# Patient Record
Sex: FEMALE | Race: BLACK OR AFRICAN AMERICAN
[De-identification: names, ages, dates, MRNs, and addresses within clinical notes are randomized per-mention and may not be internally consistent; named-entity substitution may affect disease eponyms.]

---

## 2018-12-20 ENCOUNTER — HOSPITAL ENCOUNTER (INPATIENT)
Dept: HOSPITAL 74 - JSAMEDAYSX | Age: 33
LOS: 1 days | Discharge: HOME | DRG: 621 | End: 2018-12-21
Attending: SURGERY | Admitting: SURGERY
Payer: COMMERCIAL

## 2018-12-20 VITALS — BODY MASS INDEX: 45.8 KG/M2

## 2018-12-20 DIAGNOSIS — E66.01: Primary | ICD-10-CM

## 2018-12-20 DIAGNOSIS — R16.0: ICD-10-CM

## 2018-12-20 LAB
ALBUMIN SERPL-MCNC: 3.2 G/DL (ref 3.4–5)
ALP SERPL-CCNC: 84 U/L (ref 45–117)
ALT SERPL-CCNC: 49 U/L (ref 13–61)
ANION GAP SERPL CALC-SCNC: 8 MMOL/L (ref 8–16)
AST SERPL-CCNC: 44 U/L (ref 15–37)
BILIRUB SERPL-MCNC: 0.4 MG/DL (ref 0.2–1)
BUN SERPL-MCNC: 8 MG/DL (ref 7–18)
CALCIUM SERPL-MCNC: 8.3 MG/DL (ref 8.5–10.1)
CHLORIDE SERPL-SCNC: 105 MMOL/L (ref 98–107)
CO2 SERPL-SCNC: 24 MMOL/L (ref 21–32)
CREAT SERPL-MCNC: 0.7 MG/DL (ref 0.55–1.3)
DEPRECATED RDW RBC AUTO: 17.3 % (ref 11.6–15.6)
GLUCOSE SERPL-MCNC: 137 MG/DL (ref 74–106)
HCT VFR BLD CALC: 32.1 % (ref 32.4–45.2)
HGB BLD-MCNC: 10.6 GM/DL (ref 10.7–15.3)
MCH RBC QN AUTO: 25.4 PG (ref 25.7–33.7)
MCHC RBC AUTO-ENTMCNC: 33.1 G/DL (ref 32–36)
MCV RBC: 76.7 FL (ref 80–96)
PLATELET # BLD AUTO: 376 K/MM3 (ref 134–434)
PMV BLD: 9.6 FL (ref 7.5–11.1)
POTASSIUM SERPLBLD-SCNC: 3.9 MMOL/L (ref 3.5–5.1)
PROT SERPL-MCNC: 6.9 G/DL (ref 6.4–8.2)
RBC # BLD AUTO: 4.19 M/MM3 (ref 3.6–5.2)
SODIUM SERPL-SCNC: 137 MMOL/L (ref 136–145)
WBC # BLD AUTO: 19.2 K/MM3 (ref 4–10)

## 2018-12-20 PROCEDURE — 0DB64Z3 EXCISION OF STOMACH, PERCUTANEOUS ENDOSCOPIC APPROACH, VERTICAL: ICD-10-PCS | Performed by: SURGERY

## 2018-12-20 PROCEDURE — 0DJ08ZZ INSPECTION OF UPPER INTESTINAL TRACT, VIA NATURAL OR ARTIFICIAL OPENING ENDOSCOPIC: ICD-10-PCS | Performed by: SURGERY

## 2018-12-20 PROCEDURE — 0FB24ZX EXCISION OF LEFT LOBE LIVER, PERCUTANEOUS ENDOSCOPIC APPROACH, DIAGNOSTIC: ICD-10-PCS | Performed by: SURGERY

## 2018-12-20 RX ADMIN — ONDANSETRON SCH MG: 2 INJECTION INTRAMUSCULAR; INTRAVENOUS at 21:55

## 2018-12-20 RX ADMIN — ACETAMINOPHEN SCH MG: 10 INJECTION, SOLUTION INTRAVENOUS at 20:12

## 2018-12-20 RX ADMIN — ONDANSETRON SCH MG: 2 INJECTION INTRAMUSCULAR; INTRAVENOUS at 19:24

## 2018-12-20 RX ADMIN — METOCLOPRAMIDE SCH MG: 5 INJECTION, SOLUTION INTRAMUSCULAR; INTRAVENOUS at 20:10

## 2018-12-20 RX ADMIN — ENOXAPARIN SODIUM SCH MG: 40 INJECTION SUBCUTANEOUS at 21:55

## 2018-12-20 RX ADMIN — ONDANSETRON SCH MG: 2 INJECTION INTRAMUSCULAR; INTRAVENOUS at 14:00

## 2018-12-20 RX ADMIN — ACETAMINOPHEN SCH MG: 10 INJECTION, SOLUTION INTRAVENOUS at 14:00

## 2018-12-20 RX ADMIN — FAMOTIDINE SCH MLS/HR: 20 INJECTION, SOLUTION INTRAVENOUS at 21:58

## 2018-12-20 RX ADMIN — METOCLOPRAMIDE SCH MG: 5 INJECTION, SOLUTION INTRAMUSCULAR; INTRAVENOUS at 14:00

## 2018-12-20 NOTE — HP
Admitting History and Physical





- Admission


Chief Complaint: Morbid obesity


History Source: Patient


Limitations to Obtaining History: No Limitations





- Past Medical History


Gastrointestinal: Yes: Other (Morbid obesity)


...LMP: 18





- Past Surgical History


Past Surgical History: Yes: 





- Smoking History


Smoking history: Never smoked





- Alcohol/Substance Use


Hx Alcohol Use: Yes (occas)





Home Medications





- Allergies


Allergies/Adverse Reactions: 


 Allergies











Allergy/AdvReac Type Severity Reaction Status Date / Time


 


No Known Allergies Allergy   Verified 18 13:58














- Home Medications


Home Medications: 


Ambulatory Orders





Famotidine [Pepcid] 20 mg PO BID #60 tablet 18 


Oxycodone HCl/Acetaminophen [Percocet 5-325 mg Tablet] 1 - 2 tab PO Q6H #28 tab 

MDD 4 18 











Family Disease History





- Family Disease History


Family History: Denies





Review of Systems





- Review of Systems


Constitutional: denies: Chills, Fever


HENT: reports: No Symptoms


Neck: reports: No Symptoms


Cardiovascular: reports: No Symptoms


Respiratory: reports: No Symptoms


Gastrointestinal: reports: No Symptoms


Neurological: reports: No Symptoms


Pain Intensity: 0





Physical Examination


Vital Signs: 


 Vital Signs











Temperature  97.5 F L  18 10:12


 


Pulse Rate  96 H  18 10:27


 


Respiratory Rate  18   18 10:27


 


Blood Pressure  125/68   18 10:27


 


O2 Sat by Pulse Oximetry (%)  97   18 10:27











Constitutional: Yes: No Distress


Neck: Yes: WNL


Cardiovascular: Yes: WNL


Respiratory: Yes: WNL


Gastrointestinal: Yes: Soft, Abdomen, Obese


Neurological: Yes: Alert, Oriented





Problem List





- Problems


(1) Morbid obesity due to excess calories


Code(s): E66.01 - MORBID (SEVERE) OBESITY DUE TO EXCESS CALORIES   





(2) BMI 45.0-49.9, adult


Code(s): Z68.42 - BODY MASS INDEX (BMI) 45.0-49.9, ADULT   





Assessment/Plan





Laparoscopic possible open vertical sleeve gastrectomy, possible liver biopsy, 

EGD

## 2018-12-20 NOTE — SURG
Surgery First Assist Note


First Assist: Ubaldo Uribe PA-C


Date of Service: 12/20/18


Diagnosis: 


Morbid obesity due to excess calories





Procedure: 


Laproscopic sleeve gastrectomy, EGD, liver biopsy





I was present for the entirety of the operative procedure. For further detail, 

please refer to operative report.








Visit type





- Case Type


Case Type: Scheduled





- Emergency


Emergency Visit: No





- New patient


This patient is new to me today: Yes


Date on this admission: 12/20/18

## 2018-12-20 NOTE — SPEC
DATE OF OPERATION:  12/20/2018

 

SURGEON:  Edwardo Garsia MD

 

ASSISTANT:  Ubaldo Uribe PA-C

 

PREOPERATIVE DIAGNOSES:

1.  Morbid obesity.

2.  Body mass index of 45.9.

 

POSTOPERATIVE DIAGNOSES:

1.  Morbid obesity.

2.  Body mass index of 45.9.

3.  Hepatomegaly.

 

PROCEDURE:

1.  Diagnostic laparoscopy.

2.  Laparoscopic vertical sleeve gastrectomy.

3.  Laparoscopic wedge liver biopsy.

4.  Upper endoscopy/esophagogastroduodenoscopy.

 

SPECIMEN:

1.  Greater curvature of the stomach.

2.  Liver biopsy.

 

ESTIMATED BLOOD LOSS:  30 mL

 

DRAINS:  None.

 

ANESTHESIA:  GET.

 

BOUGIE:  Size 36-Tamazight.

 

REASON FOR PROCEDURE:  This is a 33-year-old female presents for weight loss options.

 After describing different options, she decided to proceed with a laparoscopic,

possible open vertical sleeve gastrectomy, possible liver biopsy, and upper

endoscopy.

 

RISKS AND BENEFITS:  After describing the different options for weight loss

management, the patient decided to proceed with a laparoscopic, possible open

vertical sleeve gastrectomy.  The patient was seen by the respective subspecialties

and cleared for surgery.  The risks and benefits of the procedure were explained. 

These included bleeding, infection, hernia, MI, DVT, PE, injury to surrounding

structures including the liver, colon, bowel, spleen, esophagus, vessel injury, nerve

injury, weight regain, gastric leak, staple line leak, sleeve leak, obstruction,

vitamin deficiency, hair loss and death as some of the possible complications.  The

patient understood and signed informed consent.

 

DESCRIPTION OF PROCEDURE:  The patient was placed supine on the operating room table.

 The patient underwent general endotracheal intubation.  The arms were brought out at

90 degrees and secured.  A footboard was placed and the legs were secured laterally

with padding.  The abdomen was prepped and draped in the usual sterile fashion.  A

timeout was performed.

 

An incision was made in the left upper quadrant and a Veress needle inserted. 

Pneumoperitoneum was established.  Subsequently, the Veress needle was removed and a

5-mm trocar was placed under direct visualization with the laparoscope.  The

laparoscopic camera was then inserted and inspection of the abdominal cavity was

performed.  An incision was then made in the supraumbilical area and a 15-mm trocar

was placed under direct visualization.  A 5-mm trocar was then placed in the right

upper quadrant and a 5-mm trocar was placed below the left subcostal margin.  A stab

wound was made in the subxiphoid area and a Dulce clamp inserted and removed to

dilate the tract.  A Chacho liver retractor was inserted.  The post was secured at

the bedside by the nursing staff.  The patient was placed in steep reverse

Trendelenburg position and the Chacho liver retractor was used to secure the liver

towards the anterior abdominal wall.  

 

The pylorus was identified and 6 cm proximal to it, the lesser sac was entered using

the LigaSure device.  All lateral attachments to the greater curvature of the

stomach, including the short gastric vessels, were ligated using the LigaSure device

toward the gastrosplenic and gastrophrenic ligaments.  Once this was done in its

entirety, it was confirmed that all tubes within the nasal or oropharyngeal cavity,

including a temperature probe, was removed by Anesthesia.  The bougie was then

inserted by Anesthesia.  Transection of the stomach was then begun staying adjacent

to the bougie but away from the angularis.  Transection of the stomach was performed

near the portion of the stomach where the lesser sac was entered.  Two laparoscopic

Endo-CHICHI black staples were used at this location.  Laparoscopic Endo CHICHI purple

staple loads were then used for the remainder of the transection until the greater

curvature of the stomach was fully transected.  This was done staying close to the

bougie.  Care was taken to stay away from the angle of His cephalad.  The staple line

was then inspected.  Hemostasis was identified.  A leak test was then performed.  It

was clamped distally to the staple line.  Irrigation solution was placed in the left

upper quadrant and air was insufflated by Anesthesia into the sleeve.  No leaks were

identified.  No obstruction was identified.  This was done through the entirety of

the staple line.  In addition, an upper endoscopy was performed.  The endoscope was

placed into the patients mouth and the entirety of the esophagus, GE junction,

gastric pouch and staple line were inspected.  No obstruction or leak was noted.  The

stomach was suctioned and the endoscope removed fully intact.  At this point, the

irrigation solution was suctioned and again, hemostasis was noted.  A wedge liver

biopsy was then performed.  The left lobe of the liver was identified and a portion

of the edge was grasped.  Using electrocautery, a wedge of the liver was excised. 

This was removed and sent off the field as specimen.  Hemostasis at the site of the

wedge liver biopsy was attained using electrocautery.  The 15-mm supraumbilical

trocar was then removed and the greater curvature specimen removed from the site

using a sponge stick pedroza.  The specimen was inspected and a Veress needle

inserted.  The specimen insufflated adequately and no leak was identified.  The

staple line was noted to be intact.  A Sp-Geovanny device was then used to close

the fascia with a 0 Vicryl suture at the site.  Again, hemostasis was noted.  The

Chacho liver retractor was then removed under direct visualization. 

Pneumoperitoneum was desufflated and the fascial sutures were secured.  Hemostasis

was noted at all incision sites and Marcaine was injected at all incision sites.  All

incision sites were closed using 4-0 Biosyn.  Sterile dressings were applied.  The

patient tolerated the procedure well and was transferred to the recovery room in

stable condition.  The patient was transferred to telemetry for further monitoring.

 

 

ANALISA GALLEGO/[id number]

DD: 12/20/2018 13:46

DT: 12/20/2018 15:34

Job #:  [id number]

## 2018-12-20 NOTE — OP
Operative Note





- Note:


Operative Date: 12/20/18


Pre-Operative Diagnosis: Morbid obesity


Operation: Diagnostic laparoscopy.  Laparoscopic vertical sleeve gastrectomy.  

Laparoscopic wedge liver biopsy.  EGD


Post-Operative Diagnosis: Other (Morbid obesity, hepatomegaly)


Surgeon: Edwardo Garsia


Assistant: Ubaldo Uribe


Anesthesia: General


Specimens Removed: Greater curvature of stomach.  Liver biopsy


Estimated Blood Loss (mls): 30


Drains & Tubes with Location: 36 Fr Bougie


Operative Report Dictated: Yes

## 2018-12-21 VITALS — DIASTOLIC BLOOD PRESSURE: 84 MMHG | SYSTOLIC BLOOD PRESSURE: 113 MMHG | HEART RATE: 90 BPM | TEMPERATURE: 98 F

## 2018-12-21 LAB
ALBUMIN SERPL-MCNC: 3.1 G/DL (ref 3.4–5)
ALP SERPL-CCNC: 76 U/L (ref 45–117)
ALT SERPL-CCNC: 56 U/L (ref 13–61)
ANION GAP SERPL CALC-SCNC: 9 MMOL/L (ref 8–16)
AST SERPL-CCNC: 43 U/L (ref 15–37)
BILIRUB SERPL-MCNC: 0.3 MG/DL (ref 0.2–1)
BUN SERPL-MCNC: 5 MG/DL (ref 7–18)
CALCIUM SERPL-MCNC: 8.4 MG/DL (ref 8.5–10.1)
CHLORIDE SERPL-SCNC: 105 MMOL/L (ref 98–107)
CO2 SERPL-SCNC: 24 MMOL/L (ref 21–32)
CREAT SERPL-MCNC: 0.5 MG/DL (ref 0.55–1.3)
DEPRECATED RDW RBC AUTO: 17.3 % (ref 11.6–15.6)
GLUCOSE SERPL-MCNC: 113 MG/DL (ref 74–106)
HCT VFR BLD CALC: 31.3 % (ref 32.4–45.2)
HGB BLD-MCNC: 10.2 GM/DL (ref 10.7–15.3)
MCH RBC QN AUTO: 25.1 PG (ref 25.7–33.7)
MCHC RBC AUTO-ENTMCNC: 32.7 G/DL (ref 32–36)
MCV RBC: 76.8 FL (ref 80–96)
PLATELET # BLD AUTO: 305 K/MM3 (ref 134–434)
PMV BLD: 9.4 FL (ref 7.5–11.1)
POTASSIUM SERPLBLD-SCNC: 4.4 MMOL/L (ref 3.5–5.1)
PROT SERPL-MCNC: 6.9 G/DL (ref 6.4–8.2)
RBC # BLD AUTO: 4.08 M/MM3 (ref 3.6–5.2)
SODIUM SERPL-SCNC: 138 MMOL/L (ref 136–145)
WBC # BLD AUTO: 11.7 K/MM3 (ref 4–10)

## 2018-12-21 RX ADMIN — ENOXAPARIN SODIUM SCH MG: 40 INJECTION SUBCUTANEOUS at 09:47

## 2018-12-21 RX ADMIN — METOCLOPRAMIDE SCH MG: 5 INJECTION, SOLUTION INTRAMUSCULAR; INTRAVENOUS at 15:00

## 2018-12-21 RX ADMIN — ONDANSETRON SCH MG: 2 INJECTION INTRAMUSCULAR; INTRAVENOUS at 15:00

## 2018-12-21 RX ADMIN — ACETAMINOPHEN SCH MG: 10 INJECTION, SOLUTION INTRAVENOUS at 01:29

## 2018-12-21 RX ADMIN — ACETAMINOPHEN SCH MG: 10 INJECTION, SOLUTION INTRAVENOUS at 09:46

## 2018-12-21 RX ADMIN — FAMOTIDINE SCH MLS/HR: 20 INJECTION, SOLUTION INTRAVENOUS at 09:47

## 2018-12-21 RX ADMIN — METOCLOPRAMIDE SCH MG: 5 INJECTION, SOLUTION INTRAMUSCULAR; INTRAVENOUS at 01:25

## 2018-12-21 RX ADMIN — METOCLOPRAMIDE SCH MG: 5 INJECTION, SOLUTION INTRAMUSCULAR; INTRAVENOUS at 07:38

## 2018-12-21 RX ADMIN — ONDANSETRON SCH MG: 2 INJECTION INTRAMUSCULAR; INTRAVENOUS at 10:00

## 2018-12-21 RX ADMIN — ONDANSETRON SCH MG: 2 INJECTION INTRAMUSCULAR; INTRAVENOUS at 01:28

## 2018-12-21 RX ADMIN — ONDANSETRON SCH MG: 2 INJECTION INTRAMUSCULAR; INTRAVENOUS at 09:47

## 2018-12-21 NOTE — PN
Progress Note (short form)





- Note


Progress Note: 


34yo F s/p Lap sleeve gastrectomy, POD 1.  Pt states that she is feeling well, 

denies n/v, fever, chills.  Pt states abd pain is tolerable with pain 

medication.  Ambulating and urinating well. 





 Last Vital Signs











Temp Pulse Resp BP Pulse Ox


 


 97.8 F   100 H  18   120/66   98 


 


 12/21/18 05:37  12/21/18 05:37  12/21/18 05:37  12/21/18 05:37  12/20/18 21:00








 CBC, BMP





 12/21/18 05:30 





 12/21/18 05:30 








PE:


Gen: A&O x3


Resp: Breathing comfortably


Abd: soft, nondistended, mild tenderness, incisions are clean with no erythema 

or discharge. 


Ext: no edema








<Ubaldo Uribe - Last Filed: 12/21/18 08:06>





- Note


Progress Note: 





Agree


POD 1


Pain controlled


AVSS


Abd soft


UGI: no leak





Clears


Discharge planning





<Edwardo Garsia - Last Filed: 12/21/18 10:41>





Problem List





- Problems


(1) Morbid obesity due to excess calories


Assessment/Plan: 


Plan


   -will follow up Upper GI, if no leak adv to bariatric diet


   -continue pain meds


   -dvt ppx, GI ppx


   -will plan for discharge this afternoon/evening if no issues with diet. 








Code(s): E66.01 - MORBID (SEVERE) OBESITY DUE TO EXCESS CALORIES   





<Ubaldo Uribe - Last Filed: 12/21/18 08:06>





- Problems


(1) Morbid obesity due to excess calories


Code(s): E66.01 - MORBID (SEVERE) OBESITY DUE TO EXCESS CALORIES   





(2) BMI 45.0-49.9, adult


Code(s): Z68.42 - BODY MASS INDEX (BMI) 45.0-49.9, ADULT   





<Edwardo Garsia - Last Filed: 12/21/18 10:41>

## 2018-12-26 NOTE — PATH
Surgical Pathology Report



Patient Name:  NOEMI ROGERS

Accession #:  [id number]

Med. Rec. #:  [id number]                                                        

   /Age/Gender:  1985 (Age: 33) / F

Account:  [id number]                                                          

             Location: [facility]

Taken:  2018

Received:  2018

Reported:  2018

Physicians:  Edwardo Garsia M.D.

  



Specimen(s) Received

A: LIVER BIOPSY 

B: GREATER CURVATURE STOMACH 





Clinical History

Morbid obesity







Final Diagnosis

A. LIVER, BIOPSY:

LIVER PARENCHYMA WITH MILD PATCHY STEATOSIS (~30%).

NO INCREASE IN IRON AND FIBROSIS ON PERFORMED SPECIAL STAINS (IRON AND

TRICHROME).



B. STOMACH, GREATER CURVATURE, LAPAROSCOPIC VERTICAL SLEEVE GASTRECTOMY:

PORTION OF STOMACH WITH MILD CHRONIC GASTRITIS. IMMUNOHISTOCHEMICAL STAIN FOR H.

PYLORI IS NEGATIVE.

 







***Electronically Signed***

Britni Gordillo M.D.





Gross Description

A. Received in formalin labeled "liver biopsy," is a 2.5 x 1.5 x 0.9 cm tan,

irregular portion of soft tissue, consistent with a portion of liver.

Representative sections are submitted in one cassette.



B.  Received in formalin, labeled "greater curvature of stomach," is a 90 gram,

15.5 x 3.0 x 2.2 cm. portion of stomach with a stapled margin of resection. The

serosa is tan-gray with minimal attached fat. The mucosa is tan-pink with normal

folds. No mucosal masses are identified. Representative sections are submitted

in one cassette.

/2018



[place]